# Patient Record
Sex: FEMALE | Race: WHITE | NOT HISPANIC OR LATINO | Employment: FULL TIME | URBAN - METROPOLITAN AREA
[De-identification: names, ages, dates, MRNs, and addresses within clinical notes are randomized per-mention and may not be internally consistent; named-entity substitution may affect disease eponyms.]

---

## 2023-01-17 ENCOUNTER — THERAPY VISIT (OUTPATIENT)
Dept: PHYSICAL THERAPY | Facility: CLINIC | Age: 53
End: 2023-01-17
Payer: COMMERCIAL

## 2023-01-17 DIAGNOSIS — M72.2 PLANTAR FASCIITIS: ICD-10-CM

## 2023-01-17 DIAGNOSIS — S86.899A MEDIAL TIBIAL STRESS SYNDROME, UNSPECIFIED LATERALITY, INITIAL ENCOUNTER: ICD-10-CM

## 2023-01-17 PROCEDURE — 97110 THERAPEUTIC EXERCISES: CPT | Mod: GP | Performed by: PHYSICAL THERAPIST

## 2023-01-17 PROCEDURE — 97161 PT EVAL LOW COMPLEX 20 MIN: CPT | Mod: GP | Performed by: PHYSICAL THERAPIST

## 2023-01-17 NOTE — PROGRESS NOTES
Physical Therapy Initial Evaluation  Subjective:  The history is provided by the patient. No  was used.   Therapist Generated HPI Evaluation  Problem details: Pt has chronic hx of B shin splints and B plantar fasciitis dating back to the 1990s when in the [employer]. More recently around the last six months (July 2022) with continued physical requirements for being in the [employer], she has to test every six months for a physical fitness test, and has noticed the symptoms reaggravated. Checking with podiatrist in Feb for re-check on current inserts in shoes. .         Type of problem:  Bilateral ankles and left foot.    This is a chronic condition.  Condition occurred with:  Repetition/overuse.  Where condition occurred: during recreation/sport.  Patient reports pain:  Anterior and sub calcaneus (anterior tibia, medial L calcaneus).  Pain is described as other (gridning, pulled) and is intermittent.  Pain radiates to:  No radiation. Pain is worse in the A.[employer]  Since onset symptoms are unchanged (shin splints unchanged, L plantar fasciits is improving with rolling on ice bottle).  Associated symptoms:  Loss of motion/stiffness. Symptoms are exacerbated by walking (worse after sitting for long periods)  and relieved by other and ice (rolling L foot on ice bottle, self massage of shins).    Previous treatment includes physical therapy (over 10 years ago). There was significant improvement following previous treatment.  Restrictions due to condition include:  Working in normal job without restrictions.  Barriers include:  None as reported by patient.    Patient Health History         Pain is reported as 7/10 on pain scale.  General health as reported by patient is good.  Pertinent medical history includes: anemia, cancer, depression, migraines/headaches, thyroid problems and overweight.   Red flags:  None as reported by patient.  Medical allergies: none.   Surgeries include:  Cancer surgery  and other (cancer 2015, oral 2018).    Current medications:  Thyroid medication.    Current occupation is .   Primary job tasks include:  Prolonged sitting, repetitive tasks and computer work.                                    Objective:  Standing Alignment:                Ankle/Foot:  Pes cavus L and pes cavus R    Gait:      Deviations:  Ankle:  Pronation incr R and supination incr R    Flexibility/Screens:       Lower Extremity:  Decreased left lower extremity flexibility:Gastroc    Decreased right lower extremity flexibility:  Gastroc          Ankle/Foot Evaluation  ROM:    AROM:    Dorsiflexion:  Left:   4  Right:   4                    PALPATION:   Left ankle tenderness present at:  anterior tibialis  Right ankle tenderness present at:   anterior tibialis                                             Hip Evaluation    Hip Strength:      Extension:  Left: 4+/5  Pain:Right: 4+/5    Pain:    Abduction:  Left: 4+/5     Pain:Right: 4/5    Pain:                                 General     ROS    Assessment/Plan:    Patient is a 52 year old female with both sides ankle complaints.    Patient has the following significant findings with corresponding treatment plan.                Diagnosis 1:  B shin splints, L plantar fasciitis  Pain -  US, manual therapy, self management, education, directional preference exercise and home program  Decreased ROM/flexibility - manual therapy, therapeutic exercise, therapeutic activity and home program  Decreased strength - therapeutic exercise, therapeutic activities and home program  Inflammation - self management/home program  Decreased function - therapeutic activities and home program    Cumulative Therapy Evaluation is: Low complexity.    Previous and current functional limitations:  (See Goal Flow Sheet for this information)    Short term and Long term goals: (See Goal Flow Sheet for this information)     Communication ability:  Patient appears to be able to  clearly communicate and understand verbal and written communication and follow directions correctly.  Treatment Explanation - The following has been discussed with the patient:   RX ordered/plan of care  Anticipated outcomes  Possible risks and side effects  This patient would benefit from PT intervention to resume normal activities.   Rehab potential is excellent.    Frequency:  1 X week, once daily  Duration:  for 8 weeks  Discharge Plan:  Achieve all LTG.  Independent in home treatment program.  Reach maximal therapeutic benefit.    Please refer to the daily flowsheet for treatment today, total treatment time and time spent performing 1:1 timed codes.

## 2023-02-12 ENCOUNTER — HEALTH MAINTENANCE LETTER (OUTPATIENT)
Age: 53
End: 2023-02-12

## 2023-03-06 PROBLEM — M72.2 PLANTAR FASCIITIS: Status: RESOLVED | Noted: 2023-01-17 | Resolved: 2023-03-06

## 2023-03-06 PROBLEM — S86.899A SHIN SPLINTS: Status: RESOLVED | Noted: 2023-01-17 | Resolved: 2023-03-06

## 2024-03-10 ENCOUNTER — HEALTH MAINTENANCE LETTER (OUTPATIENT)
Age: 54
End: 2024-03-10

## 2025-02-16 ENCOUNTER — HEALTH MAINTENANCE LETTER (OUTPATIENT)
Age: 55
End: 2025-02-16

## 2025-03-16 ENCOUNTER — HEALTH MAINTENANCE LETTER (OUTPATIENT)
Age: 55
End: 2025-03-16